# Patient Record
Sex: MALE | Race: WHITE | NOT HISPANIC OR LATINO
[De-identification: names, ages, dates, MRNs, and addresses within clinical notes are randomized per-mention and may not be internally consistent; named-entity substitution may affect disease eponyms.]

---

## 2017-09-06 ENCOUNTER — CHARTING TRANS (OUTPATIENT)
Dept: OTHER | Age: 37
End: 2017-09-06

## 2017-09-06 ENCOUNTER — LAB SERVICES (OUTPATIENT)
Dept: OTHER | Age: 37
End: 2017-09-06

## 2017-09-06 LAB — RAPID STREP GROUP A: POSITIVE

## 2017-09-06 ASSESSMENT — PAIN SCALES - GENERAL: PAINLEVEL_OUTOF10: 8

## 2018-09-03 ENCOUNTER — HOSPITAL ENCOUNTER (OUTPATIENT)
Age: 38
Discharge: HOME | End: 2018-09-03
Attending: FAMILY MEDICINE
Payer: COMMERCIAL

## 2018-09-03 VITALS
RESPIRATION RATE: 22 BRPM | HEART RATE: 95 BPM | SYSTOLIC BLOOD PRESSURE: 156 MMHG | OXYGEN SATURATION: 100 % | TEMPERATURE: 98 F | DIASTOLIC BLOOD PRESSURE: 80 MMHG

## 2018-09-03 DIAGNOSIS — L25.9 CONTACT DERMATITIS, UNSPECIFIED CONTACT DERMATITIS TYPE, UNSPECIFIED TRIGGER: Primary | ICD-10-CM

## 2018-09-03 PROCEDURE — 99203 OFFICE O/P NEW LOW 30 MIN: CPT

## 2018-09-03 RX ORDER — METHYLPREDNISOLONE 4 MG/1
TABLET ORAL
Qty: 1 PACKAGE | Refills: 0 | Status: SHIPPED | OUTPATIENT
Start: 2018-09-03 | End: 2018-09-08

## 2018-09-03 NOTE — ED INITIAL ASSESSMENT (HPI)
Pt here with a possible allergic reaction, [place] and started developing a rash to his face, ears and bilateral arms, [place] the [address] and woke up with

## 2018-09-03 NOTE — ED NOTES
Pt discharged home, prescription electronically sent to the pharmacy , pt instructed to follow up with her primary md if symptom do not improve

## 2018-09-03 NOTE — ED PROVIDER NOTES
Patient Seen in: [place] [facility]    History   Patient presents with: Allergic Rxn Allergies (immune)    Stated Complaint: allergic reaction    HPI    14-year-old male presents with rash.   He reports traveling to his friend's He has normal reflexes. Skin: Skin is warm and dry. Rash noted. Bilateral ears: Moderate flat erythematous scaly rash without warmth, fluctuance or discharge   Psychiatric: He has a normal mood and affect.  His behavior is normal.   Nursing note and vit

## 2018-11-03 VITALS
TEMPERATURE: 98.5 F | SYSTOLIC BLOOD PRESSURE: 130 MMHG | WEIGHT: 210 LBS | BODY MASS INDEX: 25.57 KG/M2 | DIASTOLIC BLOOD PRESSURE: 70 MMHG | RESPIRATION RATE: 16 BRPM | HEART RATE: 110 BPM | HEIGHT: 76 IN